# Patient Record
Sex: FEMALE | Race: WHITE | URBAN - METROPOLITAN AREA
[De-identification: names, ages, dates, MRNs, and addresses within clinical notes are randomized per-mention and may not be internally consistent; named-entity substitution may affect disease eponyms.]

---
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
## 2021-07-19 ENCOUNTER — OFFICE VISIT (OUTPATIENT)
Dept: FAMILY MEDICINE CLINIC | Age: 21
End: 2021-07-19
Payer: MEDICARE

## 2021-07-19 VITALS
HEIGHT: 70 IN | DIASTOLIC BLOOD PRESSURE: 70 MMHG | TEMPERATURE: 98.4 F | BODY MASS INDEX: 23.48 KG/M2 | SYSTOLIC BLOOD PRESSURE: 112 MMHG | WEIGHT: 164 LBS

## 2021-07-19 DIAGNOSIS — K59.00 CONSTIPATION, UNSPECIFIED CONSTIPATION TYPE: Primary | ICD-10-CM

## 2021-07-19 PROCEDURE — G8427 DOCREV CUR MEDS BY ELIG CLIN: HCPCS | Performed by: FAMILY MEDICINE

## 2021-07-19 PROCEDURE — 1036F TOBACCO NON-USER: CPT | Performed by: FAMILY MEDICINE

## 2021-07-19 PROCEDURE — 99213 OFFICE O/P EST LOW 20 MIN: CPT | Performed by: FAMILY MEDICINE

## 2021-07-19 PROCEDURE — G8420 CALC BMI NORM PARAMETERS: HCPCS | Performed by: FAMILY MEDICINE

## 2021-07-19 RX ORDER — LEVOTHYROXINE SODIUM 112 UG/1
TABLET ORAL
COMMUNITY
Start: 2021-06-30

## 2021-07-19 SDOH — ECONOMIC STABILITY: FOOD INSECURITY: WITHIN THE PAST 12 MONTHS, YOU WORRIED THAT YOUR FOOD WOULD RUN OUT BEFORE YOU GOT MONEY TO BUY MORE.: NEVER TRUE

## 2021-07-19 SDOH — ECONOMIC STABILITY: FOOD INSECURITY: WITHIN THE PAST 12 MONTHS, THE FOOD YOU BOUGHT JUST DIDN'T LAST AND YOU DIDN'T HAVE MONEY TO GET MORE.: NEVER TRUE

## 2021-07-19 ASSESSMENT — PATIENT HEALTH QUESTIONNAIRE - PHQ9
SUM OF ALL RESPONSES TO PHQ QUESTIONS 1-9: 0
1. LITTLE INTEREST OR PLEASURE IN DOING THINGS: 0
SUM OF ALL RESPONSES TO PHQ9 QUESTIONS 1 & 2: 0
SUM OF ALL RESPONSES TO PHQ QUESTIONS 1-9: 0
SUM OF ALL RESPONSES TO PHQ QUESTIONS 1-9: 0
2. FEELING DOWN, DEPRESSED OR HOPELESS: 0

## 2021-07-19 ASSESSMENT — SOCIAL DETERMINANTS OF HEALTH (SDOH): HOW HARD IS IT FOR YOU TO PAY FOR THE VERY BASICS LIKE FOOD, HOUSING, MEDICAL CARE, AND HEATING?: NOT HARD AT ALL

## 2021-07-19 NOTE — PROGRESS NOTES
Subjective:      Patient ID: Rosita Arango is a 24 y.o. female. Chief Complaint   Patient presents with    Constipation     discuss issue        Patient presents with:  Constipation: discuss issue    She is here for the above  She uses prn miralax   Has been using laxative pills more recently. Since may  Started 1-2 years ago      She feels uncomfortable   bm every 3-4 days. And before then about 2 days  No fever no weight loss  No n no v  No blood in stool  No family hx of gi pb  Urine is passing well no pain no blood  When she can't go she will have bloating and uncomfortable and cramp  Period are intermittent. She has an iud     YOB: 2000    Date of Visit:  7/19/2021     -- Amoxicillin -- Rash    Current Outpatient Medications:  levothyroxine (SYNTHROID) 112 MCG tablet, TAKE ONE TABLET BY MOUTH DAILY, Disp: , Rfl:   Cetirizine HCl (ZYRTEC ALLERGY PO), Take by mouth, Disp: , Rfl:     No current facility-administered medications for this visit.      ---------------------------               07/19/21                      1145         ---------------------------   BP:          112/70         Site:    Left Upper Arm     Position:     Sitting        Cuff Size:  Medium Adult      Temp:   98.4 °F (36.9 °C)   TempSrc:    Temporal        Weight: 164 lb (74.4 kg)    Height: 5' 10\" (1.778 m)   ---------------------------  Body mass index is 23.53 kg/m². Wt Readings from Last 3 Encounters:  07/19/21 : 164 lb (74.4 kg)  06/04/19 : 165 lb (74.8 kg) (90 %, Z= 1.30)*  02/15/19 : 167 lb 9.6 oz (76 kg) (92 %, Z= 1.39)*    * Growth percentiles are based on CDC (Girls, 2-20 Years) data. BP Readings from Last 3 Encounters:  07/19/21 : 112/70  06/04/19 : 114/66  02/15/19 : 116/66              Review of Systems    Objective:   Physical Exam  Constitutional:       General: She is not in acute distress. Appearance: Normal appearance. She is not ill-appearing.    Neck: Thyroid: No thyroid mass or thyromegaly. Abdominal:      General: Abdomen is flat. Bowel sounds are normal. There is no distension or abdominal bruit. There are no signs of injury. Palpations: Abdomen is soft. There is no hepatomegaly, splenomegaly, mass or pulsatile mass. Tenderness: There is no abdominal tenderness. There is no guarding. Musculoskeletal:      Cervical back: Neck supple. Lymphadenopathy:      Cervical: No cervical adenopathy. Neurological:      Mental Status: She is alert. Assessment:        Diagnosis Orders   1.  Constipation, unspecified constipation type  AFL - Helio Villeda MD, Gastroenterology, [id number] [facility] note at [facility] on 12/2/20    She will see endo 8/18 at 2:30         Plan:      Start colace daily  Take fluids  Ok to use miralax  See the gi         Whitney Mcmanus MD

## 2022-01-03 ENCOUNTER — TELEPHONE (OUTPATIENT)
Dept: FAMILY MEDICINE CLINIC | Age: 22
End: 2022-01-03

## 2022-01-03 NOTE — TELEPHONE ENCOUNTER
PATIENT'S ENDOCRINOLOGIST SUGGESTED THAT PATIENT GET A REFERRAL TO [address] IN [facility]. PATIENT ATTENDS [facility]. SOMEWHERE [address] [place] [address], [facility]. [address] [facility], [place] [address]. ANY QUESTIONS CALL MOTHER. MOM WILL  REFERRAL WHEN FINISHED. A FEW NAMES WOULD BE BEST SO SHE CAN CHECK WITH INSURANCE.  PLEASE ADVISE    [facility]-[phone]

## 2022-01-04 NOTE — TELEPHONE ENCOUNTER
1.  Take prednisone as directed.  2.  Take flexeril as needed 3 times daily for muscle tightness.  3.  I recommend follow-up with Physical Therapy if symptoms are not improving.  4.  If no improvement or worsening symptoms, follow-up with Dr. Crawley.      Patient Education     Understanding Cervical Strain    There are 7 bones (vertebrae) in the neck that are part of the spine. These are called the cervical spine. Cervical strain is a medical term for neck pain. The neck has several layers of muscles. These are connected with tendons to the cervical spine and other bones. Neck pain is often the result of injury to these muscles and tendons.   Causes of cervical strain  Different types of stress on the neck can damage muscles and tendons (soft tissues) and cause cervical strain. Cervical tissues can be damaged by:     The neck being forced past its normal range of motion, such as in a car accident or sports injury    Constant, low-level stress, such as from poor posture or a poorly set-up workspace  Symptoms of cervical strain  These may include:    Neck pain or stiffness    Pain in the shoulders or upper back    Muscle spasms    Headache, often starting at the base of the neck    Irritability, trouble concentrating, or sleeplessness    Numbness in the arm or hand    Tingling or weakness in the arm  Treatment for cervical strain  This problem often gets better on its own. Treatments aim to reduce pain and inflammation and increase the range of motion of the neck. Possible treatments include:     Over-the-counter or prescription medicine. These help relieve pain and inflammation.    Muscle relaxant can help with muscle spasms.    Stretching exercises to decrease neck stiffness.    Massage to decrease neck stiffness.    Cold or heat pack. These help reduce pain and swelling.  Call 911  Call 911 right away if you have any of these:     Face drooping or numbness    Numbness or weakness, especially in the arms or on one  Patient's mother advised and expressed understanding. side    Slurred speech or difficulty speaking    Blurred vision  When to call your healthcare provider  Call your healthcare provider right away if you have any of these:    Fever of 100.4 F (38 C) or higher, or as directed by your provider    Chills    Pain or stiffness that gets worse    Symptoms that don t get better, or get worse    Numbness, tingling, weakness or shooting pains into the arms or legs    New symptoms  Sarah last reviewed this educational content on 6/1/2019 2000-2021 [employer]. All rights reserved. This information is not intended as a substitute for professional medical care. Always follow your healthcare professional's instructions.

## 2022-01-04 NOTE — TELEPHONE ENCOUNTER
Need name of  she wants to see and reason .  They need to check with their insurance to see who is covered